# Patient Record
Sex: MALE
[De-identification: names, ages, dates, MRNs, and addresses within clinical notes are randomized per-mention and may not be internally consistent; named-entity substitution may affect disease eponyms.]

---

## 2017-08-21 PROBLEM — Z00.00 ENCOUNTER FOR PREVENTIVE HEALTH EXAMINATION: Status: ACTIVE | Noted: 2017-08-21

## 2017-09-05 ENCOUNTER — APPOINTMENT (OUTPATIENT)
Dept: UROLOGY | Facility: CLINIC | Age: 77
End: 2017-09-05
Payer: MEDICARE

## 2017-09-05 VITALS
DIASTOLIC BLOOD PRESSURE: 66 MMHG | RESPIRATION RATE: 17 BRPM | TEMPERATURE: 98.1 F | SYSTOLIC BLOOD PRESSURE: 162 MMHG | WEIGHT: 210 LBS | HEART RATE: 55 BPM | BODY MASS INDEX: 30.06 KG/M2 | HEIGHT: 70 IN

## 2017-09-05 DIAGNOSIS — Z86.79 PERSONAL HISTORY OF OTHER DISEASES OF THE CIRCULATORY SYSTEM: ICD-10-CM

## 2017-09-05 DIAGNOSIS — Z87.891 PERSONAL HISTORY OF NICOTINE DEPENDENCE: ICD-10-CM

## 2017-09-05 DIAGNOSIS — K46.9 UNSPECIFIED ABDOMINAL HERNIA W/OUT OBSTRUCTION OR GANGRENE: ICD-10-CM

## 2017-09-05 PROCEDURE — 99203 OFFICE O/P NEW LOW 30 MIN: CPT

## 2017-09-05 RX ORDER — FINASTERIDE 5 MG/1
5 TABLET, FILM COATED ORAL
Refills: 0 | Status: ACTIVE | COMMUNITY

## 2017-09-05 RX ORDER — PSYLLIUM HUSK 0.4 G
CAPSULE ORAL
Refills: 0 | Status: ACTIVE | COMMUNITY

## 2017-09-05 RX ORDER — MULTIVITAMIN
TABLET ORAL
Refills: 0 | Status: ACTIVE | COMMUNITY

## 2017-09-05 RX ORDER — LOSARTAN POTASSIUM 100 MG/1
100 TABLET, FILM COATED ORAL
Refills: 0 | Status: ACTIVE | COMMUNITY

## 2018-03-06 ENCOUNTER — APPOINTMENT (OUTPATIENT)
Dept: UROLOGY | Facility: CLINIC | Age: 78
End: 2018-03-06
Payer: MEDICARE

## 2018-03-06 VITALS
DIASTOLIC BLOOD PRESSURE: 68 MMHG | HEIGHT: 70 IN | RESPIRATION RATE: 17 BRPM | BODY MASS INDEX: 24.34 KG/M2 | SYSTOLIC BLOOD PRESSURE: 136 MMHG | TEMPERATURE: 98 F | HEART RATE: 54 BPM | WEIGHT: 170 LBS

## 2018-03-06 DIAGNOSIS — N28.89 OTHER SPECIFIED DISORDERS OF KIDNEY AND URETER: ICD-10-CM

## 2018-03-06 DIAGNOSIS — R91.1 SOLITARY PULMONARY NODULE: ICD-10-CM

## 2018-03-06 PROCEDURE — 99213 OFFICE O/P EST LOW 20 MIN: CPT

## 2018-05-02 ENCOUNTER — APPOINTMENT (OUTPATIENT)
Dept: ORTHOPEDIC SURGERY | Facility: CLINIC | Age: 78
End: 2018-05-02
Payer: MEDICARE

## 2018-05-02 VITALS
SYSTOLIC BLOOD PRESSURE: 160 MMHG | HEART RATE: 62 BPM | WEIGHT: 170 LBS | BODY MASS INDEX: 24.34 KG/M2 | HEIGHT: 70 IN | DIASTOLIC BLOOD PRESSURE: 68 MMHG

## 2018-05-02 DIAGNOSIS — M17.12 UNILATERAL PRIMARY OSTEOARTHRITIS, LEFT KNEE: ICD-10-CM

## 2018-05-02 DIAGNOSIS — M17.0 BILATERAL PRIMARY OSTEOARTHRITIS OF KNEE: ICD-10-CM

## 2018-05-02 PROCEDURE — 99203 OFFICE O/P NEW LOW 30 MIN: CPT

## 2018-05-02 PROCEDURE — 73564 X-RAY EXAM KNEE 4 OR MORE: CPT | Mod: 50

## 2018-05-10 ENCOUNTER — MESSAGE (OUTPATIENT)
Age: 78
End: 2018-05-10

## 2018-10-01 ENCOUNTER — APPOINTMENT (OUTPATIENT)
Dept: ORTHOPEDIC SURGERY | Facility: CLINIC | Age: 78
End: 2018-10-01
Payer: MEDICARE

## 2018-10-01 DIAGNOSIS — M17.12 UNILATERAL PRIMARY OSTEOARTHRITIS, LEFT KNEE: ICD-10-CM

## 2018-10-01 PROCEDURE — 99212 OFFICE O/P EST SF 10 MIN: CPT

## 2018-10-09 ENCOUNTER — APPOINTMENT (OUTPATIENT)
Dept: ORTHOPEDIC SURGERY | Facility: AMBULATORY MEDICAL SERVICES | Age: 78
End: 2018-10-09
Payer: MEDICARE

## 2018-10-09 PROCEDURE — 27447 TOTAL KNEE ARTHROPLASTY: CPT | Mod: LT

## 2018-10-22 ENCOUNTER — APPOINTMENT (OUTPATIENT)
Dept: ORTHOPEDIC SURGERY | Facility: CLINIC | Age: 78
End: 2018-10-22
Payer: MEDICARE

## 2018-10-22 DIAGNOSIS — Z78.9 OTHER SPECIFIED HEALTH STATUS: ICD-10-CM

## 2018-10-22 DIAGNOSIS — Z86.39 PERSONAL HISTORY OF OTHER ENDOCRINE, NUTRITIONAL AND METABOLIC DISEASE: ICD-10-CM

## 2018-10-22 PROCEDURE — 73562 X-RAY EXAM OF KNEE 3: CPT | Mod: LT

## 2018-10-22 PROCEDURE — 99024 POSTOP FOLLOW-UP VISIT: CPT

## 2018-10-22 RX ORDER — DOCUSATE SODIUM 100 MG/1
100 CAPSULE, LIQUID FILLED ORAL
Qty: 42 | Refills: 0 | Status: ACTIVE | COMMUNITY
Start: 2018-10-11

## 2018-10-22 RX ORDER — ACETAMINOPHEN 325 MG/1
TABLET, FILM COATED ORAL
Refills: 0 | Status: ACTIVE | COMMUNITY

## 2018-10-22 RX ORDER — FAMOTIDINE 20 MG/1
20 TABLET, FILM COATED ORAL
Qty: 42 | Refills: 0 | Status: ACTIVE | COMMUNITY
Start: 2018-10-11

## 2018-10-22 RX ORDER — FERROUS SULFATE TAB EC 324 MG (65 MG FE EQUIVALENT) 324 (65 FE) MG
324 (65 FE) TABLET DELAYED RESPONSE ORAL
Qty: 63 | Refills: 0 | Status: ACTIVE | COMMUNITY
Start: 2018-10-11

## 2018-10-22 RX ORDER — OXYCODONE 5 MG/1
5 TABLET ORAL
Qty: 60 | Refills: 0 | Status: ACTIVE | COMMUNITY
Start: 2018-10-11

## 2018-10-22 RX ORDER — POLYETHYLENE GLYCOL 3350 17 G/17G
17 POWDER, FOR SOLUTION ORAL
Qty: 255 | Refills: 0 | Status: ACTIVE | COMMUNITY
Start: 2018-10-11

## 2018-10-22 RX ORDER — ASPIRIN 325 MG/1
325 TABLET, COATED ORAL
Qty: 60 | Refills: 0 | Status: ACTIVE | COMMUNITY
Start: 2018-10-11

## 2018-11-19 ENCOUNTER — APPOINTMENT (OUTPATIENT)
Dept: ORTHOPEDIC SURGERY | Facility: CLINIC | Age: 78
End: 2018-11-19
Payer: MEDICARE

## 2018-11-19 PROCEDURE — 99024 POSTOP FOLLOW-UP VISIT: CPT

## 2019-01-03 ENCOUNTER — OTHER (OUTPATIENT)
Age: 79
End: 2019-01-03

## 2019-02-04 ENCOUNTER — OTHER (OUTPATIENT)
Age: 79
End: 2019-02-04

## 2019-02-25 ENCOUNTER — APPOINTMENT (OUTPATIENT)
Dept: ORTHOPEDIC SURGERY | Facility: CLINIC | Age: 79
End: 2019-02-25
Payer: MEDICARE

## 2019-02-25 PROCEDURE — 99212 OFFICE O/P EST SF 10 MIN: CPT

## 2019-02-26 NOTE — HISTORY OF PRESENT ILLNESS
[de-identified] : 79 year old male s/p Left total knee replacement October 9, 2018. He is 4.5 months out from surgery and notes marked improvement in preoperative pain. He continues with occasional pain provoked by climbing up stairs. He is in PT twice a week.

## 2019-02-26 NOTE — PHYSICAL EXAM
[de-identified] : Well-nourished male, in no acute distress\par Alert and oriented to time, place and person\par Skin: no lesions discoloration\par Respirations: unlabored\par Cardiac: no leg swelling\par Lymphatic: no groin adenopathy \par Left knee: midline incision well healed, neutral alignment, ROM 0-120 degrees, ligaments intact

## 2019-02-26 NOTE — ADDENDUM
[FreeTextEntry1] : I, Artemio Carmelo, acted solely as a scribe for Dr. Alan Jean Baptiste on 02/25/2019 .\par \par All medical record entries made by the scribe were at my, Dr. Alan Jean Baptiste, direction and personally dictated by me on 02/25/2019 . I have reviewed the chart and agree that the record accurately reflects my personal performance of the history, physical exam, assessment and plan. I have also personally directed, reviewed, and agreed with the chart.

## 2019-03-20 ENCOUNTER — OTHER (OUTPATIENT)
Age: 79
End: 2019-03-20

## 2019-08-26 ENCOUNTER — APPOINTMENT (OUTPATIENT)
Dept: ORTHOPEDIC SURGERY | Facility: CLINIC | Age: 79
End: 2019-08-26
Payer: MEDICARE

## 2019-08-26 DIAGNOSIS — Z96.652 PRESENCE OF LEFT ARTIFICIAL KNEE JOINT: ICD-10-CM

## 2019-08-26 PROCEDURE — 99213 OFFICE O/P EST LOW 20 MIN: CPT

## 2019-08-26 NOTE — ADDENDUM
[FreeTextEntry1] : I, Jeff Ball, acted solely as a scribe for Dr. Alan Jean Baptiste on 08/26/2019  .\par  \par All medical record entries made by the scribe were at my, Dr. Alan Jean Baptiste, direction and personally dictated by me on 08/26/2019. I have reviewed the chart and agree that the record accurately reflects my personal performance of the history, physical exam, assessment and plan. I have also personally directed, reviewed, and agreed with the chart.\par

## 2019-08-26 NOTE — PHYSICAL EXAM
[de-identified] : Well-nourished male, in no acute distress\par Alert and oriented to time, place and person\par Skin: no lesions discoloration\par Respirations: unlabored\par Cardiac: no leg swelling\par Lymphatic: no groin adenopathy \par Left knee: midline incision well healed, neutral alignment, ROM 0-120 degrees, ligaments intact,\par right knee: dorsal pedal pulse 2+, varus alignment, ROM , ligaments intact, crepitus [de-identified] : AP, notch standing, lateral and sunrise Xrays of the right knee taken in the office today demonstrates right knee medial compartment degenerative narrowing, bone on bone, varus alignment, osteophytes. Left knee demonstrates satisfactory appearance, left TKR alignment and components.

## 2019-08-26 NOTE — HISTORY OF PRESENT ILLNESS
[de-identified] : 79 year old male s/p Left total knee replacement October 9, 2018. He i notes marked improvement in preoperative pain. Denies fever, chills, groin pain, thigh pain. Denies Neurovascular symptoms. He continues with right knee pain now that is chronic, intermittent, provoked by weightbearing. He is in PT 2X per week. Ambulating independently.

## 2019-08-26 NOTE — DISCUSSION/SUMMARY
[de-identified] : The patient is progressing well with left TKR. He continues with right knee pain and is going to consider his options for surgery and return with his answer. The patient is a candidate for surgery based on imaging and quality of life. We discussed risks, benefits and alternatives to surgery.\par \par \par \par 1) I reviewed the plan of care as well as a model of a knee implant equivalent to the one that will be used for their total knee joint replacement\par 2) The patient agreed to the plan of care as well as the use of implants for their total knee replacement.\par \par The patient was seen in my office regarding right knee pain. As a part of that visit, I recommended treatment with a ROM knee orthosis due to restrictions my patient is experiencing with functional limitations of daily activities. \par \par This ROM knee orthosis, in conjunction with other previously prescribed regimens will assist in overall treatment of my patient's condition. \par \par Dr. Jean Baptiste\par \par

## 2020-05-18 ENCOUNTER — APPOINTMENT (OUTPATIENT)
Dept: ORTHOPEDIC SURGERY | Facility: CLINIC | Age: 80
End: 2020-05-18
Payer: MEDICARE

## 2020-05-18 PROCEDURE — 73564 X-RAY EXAM KNEE 4 OR MORE: CPT | Mod: RT

## 2020-05-18 PROCEDURE — 99213 OFFICE O/P EST LOW 20 MIN: CPT

## 2020-05-18 NOTE — PHYSICAL EXAM
[de-identified] : Constitutional:Well nourished , well developed and in no acute distress\par Psychiatric: Alert and oriented to time place and person.Appropriate affect\par Respiratory: Unlabored respirations,no audible wheezing\par Cardiovascular: no leg swelling  ankle edema\par Vascular: no calf or thigh tenderness, \par Peripheral pulses;posterior tibial[right/left,]dorsal pedal[right/left]\par Skin:Head, neck, arms and lower extremities:no lesions or discoloration\par Lymphatics:No groin adenopathy\par Neurological: intact light touch sensation and grossly intact coordination and motor power.\par Knee right: Varus and flexion deformity no effusion range of motion 10/1:15 crepitus ligaments intact dorsal pedal pulse 2\par Knee left incision healed neutral flexion 0/120 ligaments intact\par Hip;Right: Gai normal I\par Leg lengths equal\par Inspection: no swelling or ecchymosis. \par Wounds: none \par Palpation: nontender\par Stability :no instability \par Strength :5/5 all motor groups \par ROM:  no pain with full range of motion\par Labral impingement tests[default value]\par \par Hip Left:Gai[normal/Trendelenburg]I\par Leg lengths equal\par Inspection: no swelling or ecchymosis. \par Wounds: none \par Palpation: nontender\par Stability :no instability \par Strength :5/5 all motor groups \par ROM:  no pain with full range of motion\par Labral impingement test [default value]\par \par Left ankle;\par Inspection: no erythema noted, no swelling noted.\par Palpation: no pain on palpation .\par ROM: FROM without crepitus.\par Muscle strength: 5/5\par Stability: no instability noted.\par Right ankle;\par Inspection: no erythema noted, no swelling noted.\par ROM: FROM without crepitus.\par Palpation: no pain on palpation .\par Muscle strength: 5/5.\par Stability: no instability noted.\par Left foot;\par Inspection: color, texture and turgor are normal.\par ROM: full range of motion of all joints without pain or crepitus.\par Palpation: no tenderness.\par Stability: no instability noted.\par Right foot;\par Inspection: color, texture and turgor are normal.\par ROM: full range of motion of all joints without pain or crepitus.\par Palpation: no tenderness.\par Stability: no instability noted. \par Left shoulder;\par inspection: no muscle asymmetry, no atrophy.\par Palpation: no tenderness noted, ACJ non-tender.\par ROM: full active ROM, full passive ROM.\par Strength testing): anterior deltoid, supraspinatus, infraspinatus, subscapularis all 5/5.\par Stability test: ant. apprehension negative, post. apprehension negative, relocation test negative.\par Impingement Test: negative NEER.\par Right shoulder;\par Inspection: no muscle asymmetry, no atrophy.\par Palpation: no tenderness noted, ACJ non-tender.\par ROM: full active ROM, full passive ROM.\par Strength testing): anterior deltoid, supraspinatus, infraspinatus, subscapularis all 5/5.\par Stability test: ant. apprehension negative, post. apprehension negative, relocation test negative.\par Impingement Test: negative NEER.\par Surgical Wounds: none.\par Left elbow;\par Inspection: negative swelling.\par Wounds: none.\par Palpation: non-tender.\par ROM: full ROM.\par Strength: 5/5 all groups.\par Stability: no instability.\par Mass: none.\par Right elbow;\par Inspection: negative swelling.\par Wounds: none.\par Palpation: non-tender.\par ROM: full ROM.\par Strength: 5/5 all groups.\par Stability: no instability.\par Mass: none.\par Left wrist;\par Inspection: negative swelling.\par Wound: none.\par Palpation (bone): no tenderness.\par ROM: full ROM.\par Strength: full , good.\par Right wrist;\par Inspection: negative swelling.\par Wound: none.\par Palpation (bone): no tenderness.\par ROM: full ROM.\par Strength: full , good.\par Left hand;\par Inspection: no skin changes, normal appearance.\par Wounds: none.\par Strength: full , able to make full fist.\par Sensation: light touch intact all fingers and thumb.\par Vascular: good capillary refill < 3 seconds, all fingers and thumb.\par Mass: none.\par Right hand;\par Inspection: no skin changes, normal appearance. \par Wounds: none.\par Palpation: non-tender throughout.\par Strength: full , able to make full fist.\par Sensation: light touch intact all fingers and thumb.\par Vascular: good capillary refill < 3 seconds, all fingers and thumb.\par Mass: none. \par \par \par \par \par  [de-identified] : 4 views right knee AP notch and sunrise lateral demonstrate tricompartmental osteoarthritis medial compartment joint space loss varus alignment bone-on-bone contact subchondral sclerosis and marginal osteophytes

## 2020-05-18 NOTE — DISCUSSION/SUMMARY
[de-identified] : Impression end-stage osteoarthritis right knee\par \par Plan patient is unresponsive to comprehensive medical management and has compromise quality of life. He is a candidate for total knee replacement. Risks benefits alternatives reviewed. Will be scheduled for total knee replacement\par \par

## 2020-05-18 NOTE — HISTORY OF PRESENT ILLNESS
[de-identified] : 80 year-old male spontaneous onset chronic intermittent pain anterior right knee provoked by weightbearing associated with stiffness and limping. Denies swelling locking giving out. Has undergone steroid injection right knee in the past with only one day and symptom improvement. Overall feels quality-of-life markedly compromised

## 2020-10-19 ENCOUNTER — APPOINTMENT (OUTPATIENT)
Dept: ORTHOPEDIC SURGERY | Facility: CLINIC | Age: 80
End: 2020-10-19
Payer: MEDICARE

## 2020-10-19 DIAGNOSIS — M17.11 UNILATERAL PRIMARY OSTEOARTHRITIS, RIGHT KNEE: ICD-10-CM

## 2020-10-19 PROCEDURE — 99212 OFFICE O/P EST SF 10 MIN: CPT

## 2020-10-19 PROCEDURE — 99072 ADDL SUPL MATRL&STAF TM PHE: CPT

## 2020-10-19 NOTE — HISTORY OF PRESENT ILLNESS
[de-identified] : 80 year-old male Follow up  spontaneous onset chronic intermittent pain anterior right knee provoked by weightbearing associated with stiffness and limping. Denies swelling locking giving out. Has undergone steroid injection right knee in the past with only one day and symptom improvement. Overall feels quality-of-life markedly compromised

## 2020-10-19 NOTE — DISCUSSION/SUMMARY
[de-identified] : Impression end-stage osteoarthritis right knee\par \par Plan patient is unresponsive to comprehensive medical management and has compromise quality of life. He is a candidate for total knee replacement. Risks benefits alternatives reviewed. Will be scheduled for total knee replacement\par \par

## 2020-10-19 NOTE — PHYSICAL EXAM
[de-identified] : Constitutional:Well nourished , well developed and in no acute distress\par Psychiatric: Alert and oriented to time place and person.Appropriate affect\par Respiratory: Unlabored respirations,no audible wheezing\par Cardiovascular: no leg swelling  ankle edema\par Vascular: no calf or thigh tenderness, \par Peripheral pulses;posterior tibial[right/left,]dorsal pedal[right/left]\par Skin:Head, neck, arms and lower extremities:no lesions or discoloration\par Lymphatics:No groin adenopathy\par Neurological: intact light touch sensation and grossly intact coordination and motor power.\par Knee right: Varus and flexion deformity no effusion range of motion 10/1:15 crepitus ligaments intact dorsal pedal pulse 2\par Knee left incision healed neutral flexion 0/120 ligaments intact\par  [de-identified] : 4 views right knee AP notch and sunrise lateral demonstrate tricompartmental osteoarthritis medial compartment joint space loss varus alignment bone-on-bone contact subchondral sclerosis and marginal osteophytes

## 2020-10-27 ENCOUNTER — APPOINTMENT (OUTPATIENT)
Dept: ORTHOPEDIC SURGERY | Facility: AMBULATORY MEDICAL SERVICES | Age: 80
End: 2020-10-27
Payer: MEDICARE

## 2020-10-27 PROCEDURE — 27447 TOTAL KNEE ARTHROPLASTY: CPT | Mod: RT

## 2020-11-07 ENCOUNTER — APPOINTMENT (OUTPATIENT)
Dept: ORTHOPEDIC SURGERY | Facility: CLINIC | Age: 80
End: 2020-11-07
Payer: MEDICARE

## 2020-11-07 VITALS
WEIGHT: 169 LBS | DIASTOLIC BLOOD PRESSURE: 74 MMHG | HEART RATE: 91 BPM | HEIGHT: 70 IN | SYSTOLIC BLOOD PRESSURE: 164 MMHG | OXYGEN SATURATION: 98 % | BODY MASS INDEX: 24.2 KG/M2

## 2020-11-07 PROCEDURE — 99024 POSTOP FOLLOW-UP VISIT: CPT

## 2020-11-07 PROCEDURE — 73562 X-RAY EXAM OF KNEE 3: CPT | Mod: RT

## 2020-11-07 NOTE — HISTORY OF PRESENT ILLNESS
[de-identified] : 11 days Postop right total knee [de-identified] : Denies fever chills or neurovascular symptoms. Receiving home physical therapy [de-identified] : Well-nourished no distress right knee incision healed staples intact no erythema or fluctuance discharge range of motion 5/90 ligaments intact neurovascular intact right lower extremity mild swelling right leg no calf tenderness [de-identified] : X-ray right knee 3 views demonstrates satisfactory postop appearance right total knee components [de-identified] : Right total knee replacement [de-identified] : staple removal, physical therapy prescription,followup one month

## 2020-12-07 ENCOUNTER — APPOINTMENT (OUTPATIENT)
Dept: ORTHOPEDIC SURGERY | Facility: CLINIC | Age: 80
End: 2020-12-07
Payer: MEDICARE

## 2020-12-07 VITALS — WEIGHT: 169 LBS | BODY MASS INDEX: 24.2 KG/M2 | HEIGHT: 70 IN

## 2020-12-07 PROCEDURE — 99024 POSTOP FOLLOW-UP VISIT: CPT

## 2020-12-07 NOTE — HISTORY OF PRESENT ILLNESS
[de-identified] : October 27, 2020 Postop right total knee [de-identified] : Denies fever chills or neurovascular symptoms. Receiving home physical therapy reports progressive decrease in Postoperative pain [de-identified] : Well-nourished no distress right knee incision healed staples intact no erythema or fluctuance discharge range of motion 5/110 ligaments intact neurovascular intact right lower extremity mild swelling right leg no calf tenderness [de-identified] : Right total knee replacement [de-identified] : physical therapy prescription,followup one month

## 2021-03-01 ENCOUNTER — APPOINTMENT (OUTPATIENT)
Dept: ORTHOPEDIC SURGERY | Facility: CLINIC | Age: 81
End: 2021-03-01
Payer: MEDICARE

## 2021-03-01 VITALS — HEIGHT: 69 IN | WEIGHT: 163 LBS | BODY MASS INDEX: 24.14 KG/M2

## 2021-03-01 PROCEDURE — 99213 OFFICE O/P EST LOW 20 MIN: CPT

## 2021-03-01 PROCEDURE — 99072 ADDL SUPL MATRL&STAF TM PHE: CPT

## 2021-03-01 NOTE — DISCUSSION/SUMMARY
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 Patient provided with prescription for physical therapy which he reports he did not pursue after right total knee replacement versus prior left total knee replacement. Followup 2 months

## 2021-03-01 NOTE — PHYSICAL EXAM
[de-identified] : Constitutional:Well nourished , well developed and in no acute distress\par Psychiatric: Alert and oriented to time place and person.Appropriate affect\par Respiratory: Unlabored respirations,no audible wheezing\par Cardiovascular: no leg swelling  ankle edema\par Vascular: no calf or thigh tenderness, \par Peripheral pulses;posterior tibial[right/left,]dorsal pedal[right/left]\par Skin:Head, neck, arms and lower extremities:no lesions or discoloration\par Lymphatics:No groin adenopathy\par Neurological: intact light touch sensation and grossly intact coordination and motor power.\par Knee right: No effusion flexion 0/110 ligaments intact negative flexion distraction no local tenderness\par Knee left incision healed neutral flexion 0/120 ligaments intact\par

## 2021-03-01 NOTE — HISTORY OF PRESENT ILLNESS
[de-identified] : Patient followup right total knee replacement October 27, 2020. He is symptom-free when ambulating but complains of "annoyance" discomfort over her medial aspect patella that is episodic when sitting lasting one to 2 minutes and resolved spontaneously he denies locking giving out. Ambulating independently. Expresses disappointment in comparison to contralateral total knee replacement

## 2021-05-03 ENCOUNTER — APPOINTMENT (OUTPATIENT)
Dept: ORTHOPEDIC SURGERY | Facility: CLINIC | Age: 81
End: 2021-05-03
Payer: MEDICARE

## 2021-05-03 VITALS
HEART RATE: 50 BPM | BODY MASS INDEX: 23.62 KG/M2 | SYSTOLIC BLOOD PRESSURE: 149 MMHG | HEIGHT: 70 IN | WEIGHT: 165 LBS | DIASTOLIC BLOOD PRESSURE: 59 MMHG

## 2021-05-03 DIAGNOSIS — M11.261 OTHER CHONDROCALCINOSIS, RIGHT KNEE: ICD-10-CM

## 2021-05-03 PROCEDURE — 99072 ADDL SUPL MATRL&STAF TM PHE: CPT

## 2021-05-03 PROCEDURE — 99213 OFFICE O/P EST LOW 20 MIN: CPT

## 2021-05-03 NOTE — DISCUSSION/SUMMARY
[de-identified] : Right total knee replacement with patellar tendinitis\par Plan trial of physical therapy, followup amounts

## 2021-05-03 NOTE — PHYSICAL EXAM
[de-identified] : Constitutional:Well nourished , well developed and in no acute distress\par Psychiatric: Alert and oriented to time place and person.Appropriate affect\par Respiratory: Unlabored respirations,no audible wheezing\par Cardiovascular: no leg swelling  ankle edema\par Vascular: no calf or thigh tenderness, \par Peripheral pulses;posterior tibial[right/left,]dorsal pedal[right/left]\par Skin:Head, neck, arms and lower extremities:no lesions or discoloration\par Lymphatics:No groin adenopathy\par Neurological: intact light touch sensation and grossly intact coordination and motor power.\par Knee right: No effusion flexion 0/110 ligaments intact negative flexion distraction Tenderness patella tendon origin\par Knee left incision healed neutral flexion 0/120 ligaments intact\par

## 2021-05-03 NOTE — HISTORY OF PRESENT ILLNESS
[de-identified] : Patient followup right total knee replacement October 27, 2020. He is symptom-free when ambulating but complains of "annoyance" discomfort over her medial aspect patella that is episodic when sitting lasting one to 2 minutes and resolved spontaneously he denies locking giving out. Ambulating independently. Expresses disappointment in comparison to contralateral total knee replacement

## 2021-07-12 ENCOUNTER — APPOINTMENT (OUTPATIENT)
Dept: ORTHOPEDIC SURGERY | Facility: CLINIC | Age: 81
End: 2021-07-12
Payer: MEDICARE

## 2021-07-12 VITALS — WEIGHT: 167 LBS | HEIGHT: 69 IN | BODY MASS INDEX: 24.73 KG/M2

## 2021-07-12 PROCEDURE — 99072 ADDL SUPL MATRL&STAF TM PHE: CPT

## 2021-07-12 PROCEDURE — 99213 OFFICE O/P EST LOW 20 MIN: CPT

## 2021-07-12 NOTE — HISTORY OF PRESENT ILLNESS
[de-identified] : Follow-up right total knee replacement October 27, 2020.  Patient reports that postoperative pain anterior aspect right knee has resolved.  Continues to report decrease in knee flexion with anterior discomfort with and flexion.  Patient is pain-free when walking or when working and is ambulating independently

## 2021-07-12 NOTE — PHYSICAL EXAM
[de-identified] : Constitutional:Well nourished , well developed and in no acute distress\par Psychiatric: Alert and oriented to time place and person.Appropriate affect\par Respiratory: Unlabored respirations,no audible wheezing\par Cardiovascular: no leg swelling  ankle edema\par Vascular: no calf or thigh tenderness, \par Peripheral pulses;posterior tibial[right/left,]dorsal pedal[right/left]\par Skin:Head, neck, arms and lower extremities:no lesions or discoloration\par Lymphatics:No groin adenopathy\par Neurological: intact light touch sensation and grossly intact coordination and motor power.\par Knee right: No effusion flexion 0/115 ligaments intact negative flexion distraction Tenderness patella tendon origin\par Knee left incision healed neutral flexion 0/120 ligaments intact\par

## 2021-07-12 NOTE — CONSULT LETTER
[Dear  ___] : Dear  [unfilled], [Consult Letter:] : I had the pleasure of evaluating your patient, [unfilled]. [Consult Closing:] : Thank you very much for allowing me to participate in the care of this patient.  If you have any questions, please do not hesitate to contact me. [Sincerely,] : Sincerely, [FreeTextEntry2] : IGGY SCOTT [FreeTextEntry3] : Alan Jean Baptiste MD, FAAOS\par Total Hip and Total Knee Replacement \par Anterior Total Hip Replacement\par [facility]\par [address]\par [place], [place] \par [phone]\par fax [phone]\par

## 2021-10-18 ENCOUNTER — APPOINTMENT (OUTPATIENT)
Dept: ORTHOPEDIC SURGERY | Facility: CLINIC | Age: 81
End: 2021-10-18
Payer: MEDICARE

## 2021-10-18 VITALS — HEIGHT: 70 IN | BODY MASS INDEX: 24.34 KG/M2 | WEIGHT: 170 LBS

## 2021-10-18 PROCEDURE — 99212 OFFICE O/P EST SF 10 MIN: CPT

## 2021-10-18 NOTE — PHYSICAL EXAM
[de-identified] : Constitutional:Well nourished , well developed and in no acute distress\par Psychiatric: Alert and oriented to time place and person.Appropriate affect\par Respiratory: Unlabored respirations,no audible wheezing\par Cardiovascular: no leg swelling  ankle edema\par Vascular: no calf or thigh tenderness, \par Peripheral pulses;posterior tibial[right/left,]dorsal pedal[right/left]\par Skin:Head, neck, arms and lower extremities:no lesions or discoloration\par Lymphatics:No groin adenopathy\par Neurological: intact light touch sensation and grossly intact coordination and motor power.\par Knee right: No effusion flexion 0/ 110 ligaments intact negative flexion distraction T\par Knee left incision healed neutral flexion 0/120 ligaments intact\par

## 2021-10-18 NOTE — HISTORY OF PRESENT ILLNESS
[de-identified] : 81-year-old male follow-up right total knee replacement October 27, 2020.  Reports that right knee is pain-free when walking.  He does complain of stiffness when going up stairs and at times mild right anterior knee pain when descending stairs.  Denies swelling locking giving out

## 2022-04-18 ENCOUNTER — APPOINTMENT (OUTPATIENT)
Dept: ORTHOPEDIC SURGERY | Facility: CLINIC | Age: 82
End: 2022-04-18
Payer: MEDICARE

## 2022-04-18 VITALS — HEIGHT: 70 IN | BODY MASS INDEX: 24.34 KG/M2 | WEIGHT: 170 LBS

## 2022-04-18 DIAGNOSIS — Z96.651 PRESENCE OF RIGHT ARTIFICIAL KNEE JOINT: ICD-10-CM

## 2022-04-18 PROCEDURE — 99213 OFFICE O/P EST LOW 20 MIN: CPT

## 2022-04-18 PROCEDURE — 73562 X-RAY EXAM OF KNEE 3: CPT | Mod: RT

## 2022-04-18 NOTE — DISCUSSION/SUMMARY
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  For now he like to pursue continue therapy.  Follow-up 4 months

## 2022-04-18 NOTE — PHYSICAL EXAM
[de-identified] : Constitutional:Well nourished , well developed and in no acute distress\par Psychiatric: Alert and oriented to time place and person.Appropriate affect\par Respiratory: Unlabored respirations,no audible wheezing\par Cardiovascular: no leg swelling  ankle edema\par Vascular: no calf or thigh tenderness, \par Peripheral pulses;posterior tibial[right/left,]dorsal pedal[right/left]\par Skin:Head, neck, arms and lower extremities:no lesions or discoloration\par Lymphatics:No groin adenopathy\par Neurological: intact light touch sensation and grossly intact coordination and motor power.\par Knee right: No effusion flexion 0/ 110 ligaments intact negative flexion distraction T\par Knee left incision healed Range of motion 0/115 mid range valgus varus laxity.  Minimally positive flexion distraction at 90 degrees [de-identified] : X-rays multiple views right knee AP notch and sunrise lateral Satisfactory alignment femoral and tibial components.  Patella demonstrates mild lateral tilt with probable impingement lateral undersurface patella facet and femoral component lateral interbody fusion L2-4

## 2022-04-18 NOTE — HISTORY OF PRESENT ILLNESS
[de-identified] : 81-year-old male follow-up right total knee replacement October 27, 2020.  Reports that right knee is pain-free when walking.  He does complain of stiffness when going up stairs and right anterior knee pain when descending stairs.  Denies swelling locking giving out

## 2022-06-27 ENCOUNTER — APPOINTMENT (OUTPATIENT)
Dept: ORTHOPEDIC SURGERY | Facility: CLINIC | Age: 82
End: 2022-06-27
Payer: MEDICARE

## 2022-06-27 DIAGNOSIS — Z96.652 PRESENCE OF LEFT ARTIFICIAL KNEE JOINT: ICD-10-CM

## 2022-06-27 DIAGNOSIS — Z96.651 PRESENCE OF RIGHT ARTIFICIAL KNEE JOINT: ICD-10-CM

## 2022-06-27 PROCEDURE — 99213 OFFICE O/P EST LOW 20 MIN: CPT

## 2022-06-27 NOTE — DISCUSSION/SUMMARY
[de-identified] : Impression right total knee replacement\par Plan Home exercise program follow-up 6 months.  Advised to contact primary care physician for referral to neurology for assessment of complaints of balance

## 2022-06-27 NOTE — HISTORY OF PRESENT ILLNESS
[de-identified] : Follow-up right total knee replacement October 27, 2020.  Patient pain-free when walking but continues complain of stiffness and limitation of flexion.  Limitation of flexion interferes with getting dressed.  He also reports that he "cannot support my weight on 1 leg" when going up stairs.  Denies swelling locking or giving out.  He does report recent onset of a sense of imbalance.

## 2022-06-27 NOTE — PHYSICAL EXAM
[de-identified] : Constitutional:Well nourished , well developed and in no acute distress\par Psychiatric: Alert and oriented to time place and person.Appropriate affect\par Respiratory: Unlabored respirations,no audible wheezing\par Cardiovascular: no leg swelling  ankle edema\par Vascular: no calf or thigh tenderness, \par Peripheral pulses;posterior tibial[right/left,]dorsal pedal[right/left]\par Skin:Head, neck, arms and lower extremities:no lesions or discoloration\par Lymphatics:No groin adenopathy\par Neurological: intact light touch sensation and grossly intact coordination and motor power.\par Knee right: No effusion flexion 0/ 110 ligaments intact negative flexion distraction T\par Knee left incision healed Range of motion 0/115 mid range valgus varus laxity.  Minimally positive flexion distraction at 90 degrees

## 2022-07-18 ENCOUNTER — APPOINTMENT (OUTPATIENT)
Dept: ORTHOPEDIC SURGERY | Facility: CLINIC | Age: 82
End: 2022-07-18

## 2022-11-07 ENCOUNTER — APPOINTMENT (OUTPATIENT)
Dept: ORTHOPEDIC SURGERY | Facility: CLINIC | Age: 82
End: 2022-11-07

## 2023-03-21 NOTE — REASON FOR VISIT
[FreeTextEntry2] : Right total knee replacement Elidel Counseling: Patient may experience a mild burning sensation during topical application. Elidel is not approved in children less than 2 years of age. There have been case reports of hematologic and skin malignancies in patients using topical calcineurin inhibitors although causality is questionable.